# Patient Record
Sex: MALE | Race: WHITE | Employment: FULL TIME | URBAN - METROPOLITAN AREA
[De-identification: names, ages, dates, MRNs, and addresses within clinical notes are randomized per-mention and may not be internally consistent; named-entity substitution may affect disease eponyms.]

---

## 2017-10-09 ENCOUNTER — TELEPHONE (OUTPATIENT)
Dept: ADMINISTRATIVE | Age: 57
End: 2017-10-09

## 2017-10-10 NOTE — TELEPHONE ENCOUNTER
Patient was previous patient and is requesting to re-establish care also his wife jorge is current patient. Please advise if ok to schedule to re-establish and if so when?

## 2017-10-17 ENCOUNTER — TELEPHONE (OUTPATIENT)
Dept: FAMILY MEDICINE CLINIC | Age: 57
End: 2017-10-17

## 2017-10-17 ENCOUNTER — OFFICE VISIT (OUTPATIENT)
Dept: FAMILY MEDICINE CLINIC | Age: 57
End: 2017-10-17

## 2017-10-17 VITALS
DIASTOLIC BLOOD PRESSURE: 84 MMHG | HEART RATE: 74 BPM | RESPIRATION RATE: 16 BRPM | SYSTOLIC BLOOD PRESSURE: 142 MMHG | WEIGHT: 191.6 LBS | HEIGHT: 75 IN | TEMPERATURE: 98.6 F | OXYGEN SATURATION: 98 % | BODY MASS INDEX: 23.82 KG/M2

## 2017-10-17 DIAGNOSIS — I15.9 SECONDARY HYPERTENSION: Primary | ICD-10-CM

## 2017-10-17 PROCEDURE — G8420 CALC BMI NORM PARAMETERS: HCPCS | Performed by: NURSE PRACTITIONER

## 2017-10-17 PROCEDURE — 1036F TOBACCO NON-USER: CPT | Performed by: NURSE PRACTITIONER

## 2017-10-17 PROCEDURE — G8427 DOCREV CUR MEDS BY ELIG CLIN: HCPCS | Performed by: NURSE PRACTITIONER

## 2017-10-17 PROCEDURE — 3017F COLORECTAL CA SCREEN DOC REV: CPT | Performed by: NURSE PRACTITIONER

## 2017-10-17 PROCEDURE — 99203 OFFICE O/P NEW LOW 30 MIN: CPT | Performed by: NURSE PRACTITIONER

## 2017-10-17 PROCEDURE — G8484 FLU IMMUNIZE NO ADMIN: HCPCS | Performed by: NURSE PRACTITIONER

## 2017-10-17 RX ORDER — HYDROCHLOROTHIAZIDE 25 MG/1
12.5 TABLET ORAL DAILY PRN
Qty: 30 TABLET | Refills: 1 | Status: SHIPPED | OUTPATIENT
Start: 2017-10-17 | End: 2017-11-24 | Stop reason: ALTCHOICE

## 2017-10-17 ASSESSMENT — ENCOUNTER SYMPTOMS
CONSTIPATION: 0
SORE THROAT: 0
NAUSEA: 0
SINUS PAIN: 0
PHOTOPHOBIA: 0
SHORTNESS OF BREATH: 0
EYE REDNESS: 0
DIARRHEA: 0
WHEEZING: 0
VOMITING: 0
RHINORRHEA: 0
SINUS PRESSURE: 0
EYE PAIN: 0
EYE DISCHARGE: 0
EYE ITCHING: 0
CHEST TIGHTNESS: 0

## 2017-10-17 ASSESSMENT — PATIENT HEALTH QUESTIONNAIRE - PHQ9
SUM OF ALL RESPONSES TO PHQ9 QUESTIONS 1 & 2: 0
2. FEELING DOWN, DEPRESSED OR HOPELESS: 0
SUM OF ALL RESPONSES TO PHQ QUESTIONS 1-9: 0
1. LITTLE INTEREST OR PLEASURE IN DOING THINGS: 0

## 2017-10-17 NOTE — PROGRESS NOTES
diaphoresis, fatigue and fever. HENT: Negative for congestion, dental problem, ear discharge, ear pain, postnasal drip, rhinorrhea, sinus pain, sinus pressure, sneezing and sore throat. Eyes: Negative for photophobia, pain, discharge, redness, itching and visual disturbance. Respiratory: Negative for chest tightness, shortness of breath and wheezing. Cardiovascular: Negative for chest pain and leg swelling. Gastrointestinal: Negative for constipation, diarrhea, nausea and vomiting. Musculoskeletal: Negative for arthralgias and myalgias. Allergic/Immunologic: Negative for environmental allergies and food allergies. Neurological: Positive for headaches. Negative for dizziness, syncope, weakness and numbness. Objective:   BP (!) 142/84 (Site: Right Arm, Position: Sitting, Cuff Size: Medium Adult)   Pulse 74   Temp 98.6 °F (37 °C) (Temporal)   Resp 16   Ht 6' 3.43\" (1.916 m)   Wt 191 lb 9.6 oz (86.9 kg)   SpO2 98%   BMI 23.67 kg/m²     Physical Exam   Constitutional: He is oriented to person, place, and time. Vital signs are normal. He appears well-developed and well-nourished. HENT:   Head: Normocephalic. Right Ear: Hearing, tympanic membrane, external ear and ear canal normal.   Left Ear: Hearing, tympanic membrane, external ear and ear canal normal.   Nose: Nose normal.   Mouth/Throat: Uvula is midline, oropharynx is clear and moist and mucous membranes are normal.   Eyes: Conjunctivae and EOM are normal.   Neck: Normal range of motion. Neck supple. Cardiovascular: Normal rate and regular rhythm. Pulmonary/Chest: Effort normal and breath sounds normal.   Abdominal: Normal appearance and bowel sounds are normal.   Musculoskeletal: Normal range of motion. Neurological: He is alert and oriented to person, place, and time. Skin: Skin is warm and dry. Psychiatric: He has a normal mood and affect.  His behavior is normal. Judgment and thought content normal.   Nursing note and vitals reviewed. Assessment:     1. Secondary hypertension         Plan:      No orders of the defined types were placed in this encounter. Orders Placed This Encounter   Medications    hydrochlorothiazide (HYDRODIURIL) 25 MG tablet     Sig: Take 0.5 tablets by mouth daily as needed (Systolic Blood Pressure greater than 170)     Dispense:  30 tablet     Refill:  1     Health maintenance reviewed in great detail this visit. Patient has declined all labs and health maintenance recommendations at this time. Discussed in detail how to take 1/2 tab of hydrodiuril as needed for systolic blood pressure over 170. High blood pressure cough be related to work stress and higher intake of salty foods. Advised patient to check blood pressure twice a day and write it down in a journal for Dr. Monserrat Mullen at your next visit. Only take medication if above parameters. Monitor for signs of low blood pressure such as fatigue, dizziness, lightheadedness, clammy skin. If those occur stop taking medication and follow up. Continue to exercise, and monitor your diet. Return for Schedule with cy follow up blood pressure. .    Reviewed with the patient: current clinical status, medications, activities and diet. Side effects, adverse effects of the medication prescribed today, as well as treatment plan/ rationale and result expectations have been discussed with the patient who expresses understanding and desires to proceed. Close follow up to evaluate treatment results and for coordination of care. I have reviewed the patient's medical history in detail and updated the computerized patient record.     Germain Schmidt NP

## 2017-10-17 NOTE — PATIENT INSTRUCTIONS
Patient Education        DASH Diet: Care Instructions  Your Care Instructions  The DASH diet is an eating plan that can help lower your blood pressure. DASH stands for Dietary Approaches to Stop Hypertension. Hypertension is high blood pressure. The DASH diet focuses on eating foods that are high in calcium, potassium, and magnesium. These nutrients can lower blood pressure. The foods that are highest in these nutrients are fruits, vegetables, low-fat dairy products, nuts, seeds, and legumes. But taking calcium, potassium, and magnesium supplements instead of eating foods that are high in those nutrients does not have the same effect. The DASH diet also includes whole grains, fish, and poultry. The DASH diet is one of several lifestyle changes your doctor may recommend to lower your high blood pressure. Your doctor may also want you to decrease the amount of sodium in your diet. Lowering sodium while following the DASH diet can lower blood pressure even further than just the DASH diet alone. Follow-up care is a key part of your treatment and safety. Be sure to make and go to all appointments, and call your doctor if you are having problems. It's also a good idea to know your test results and keep a list of the medicines you take. How can you care for yourself at home? Following the DASH diet  · Eat 4 to 5 servings of fruit each day. A serving is 1 medium-sized piece of fruit, ½ cup chopped or canned fruit, 1/4 cup dried fruit, or 4 ounces (½ cup) of fruit juice. Choose fruit more often than fruit juice. · Eat 4 to 5 servings of vegetables each day. A serving is 1 cup of lettuce or raw leafy vegetables, ½ cup of chopped or cooked vegetables, or 4 ounces (½ cup) of vegetable juice. Choose vegetables more often than vegetable juice. · Get 2 to 3 servings of low-fat and fat-free dairy each day. A serving is 8 ounces of milk, 1 cup of yogurt, or 1 ½ ounces of cheese. · Eat 6 to 8 servings of grains each day.  A serving is 1 slice of bread, 1 ounce of dry cereal, or ½ cup of cooked rice, pasta, or cooked cereal. Try to choose whole-grain products as much as possible. · Limit lean meat, poultry, and fish to 2 servings each day. A serving is 3 ounces, about the size of a deck of cards. · Eat 4 to 5 servings of nuts, seeds, and legumes (cooked dried beans, lentils, and split peas) each week. A serving is 1/3 cup of nuts, 2 tablespoons of seeds, or ½ cup of cooked beans or peas. · Limit fats and oils to 2 to 3 servings each day. A serving is 1 teaspoon of vegetable oil or 2 tablespoons of salad dressing. · Limit sweets and added sugars to 5 servings or less a week. A serving is 1 tablespoon jelly or jam, ½ cup sorbet, or 1 cup of lemonade. · Eat less than 2,300 milligrams (mg) of sodium a day. If you limit your sodium to 1,500 mg a day, you can lower your blood pressure even more. Tips for success  · Start small. Do not try to make dramatic changes to your diet all at once. You might feel that you are missing out on your favorite foods and then be more likely to not follow the plan. Make small changes, and stick with them. Once those changes become habit, add a few more changes. · Try some of the following:  ¨ Make it a goal to eat a fruit or vegetable at every meal and at snacks. This will make it easy to get the recommended amount of fruits and vegetables each day. ¨ Try yogurt topped with fruit and nuts for a snack or healthy dessert. ¨ Add lettuce, tomato, cucumber, and onion to sandwiches. ¨ Combine a ready-made pizza crust with low-fat mozzarella cheese and lots of vegetable toppings. Try using tomatoes, squash, spinach, broccoli, carrots, cauliflower, and onions. ¨ Have a variety of cut-up vegetables with a low-fat dip as an appetizer instead of chips and dip. ¨ Sprinkle sunflower seeds or chopped almonds over salads. Or try adding chopped walnuts or almonds to cooked vegetables.   ¨ Try some vegetarian meals using beans and peas. Add garbanzo or kidney beans to salads. Make burritos and tacos with mashed quiñones beans or black beans. Where can you learn more? Go to https://harish.[employer]. org and sign in to your [employer] account. Enter U128 in the [employer] box to learn more about \"DASH Diet: Care Instructions. \"     If you do not have an account, please click on the \"Sign Up Now\" link. Current as of: April 3, 2017  Content Version: 11.3  © 7220-9757 [employer]. Care instructions adapted under license by [facility] ([facility]). If you have questions about a medical condition or this instruction, always ask your healthcare professional. [address] any warranty or liability for your use of this information. Patient Education        High Blood Pressure: Care Instructions  Your Care Instructions  If your blood pressure is usually above 140/90, you have high blood pressure, or hypertension. That means the top number is 140 or higher or the bottom number is 90 or higher, or both. Despite what a lot of people think, high blood pressure usually doesn't cause headaches or make you feel dizzy or lightheaded. It usually has no symptoms. But it does increase your risk for heart attack, stroke, and kidney or eye damage. The higher your blood pressure, the more your risk increases. Your doctor will give you a goal for your blood pressure. Your goal will be based on your health and your age. An example of a goal is to keep your blood pressure below 140/90. Lifestyle changes, such as eating healthy and being active, are always important to help lower blood pressure. You might also take medicine to reach your blood pressure goal.  Follow-up care is a key part of your treatment and safety. Be sure to make and go to all appointments, and call your doctor if you are having problems. It's also a good idea to know your test results and keep a list of the medicines you take.   How increases your risk for heart attack and stroke. If you need help quitting, talk to your doctor about stop-smoking programs and medicines. These can increase your chances of quitting for good. When should you call for help? Call 911 anytime you think you may need emergency care. This may mean having symptoms that suggest that your blood pressure is causing a serious heart or blood vessel problem. Your blood pressure may be over 180/110. For example, call 911 if:  · You have symptoms of a heart attack. These may include:  ¨ Chest pain or pressure, or a strange feeling in the chest.  ¨ Sweating. ¨ Shortness of breath. ¨ Nausea or vomiting. ¨ Pain, pressure, or a strange feeling in the back, neck, jaw, or upper belly or in one or both shoulders or arms. ¨ Lightheadedness or sudden weakness. ¨ A fast or irregular heartbeat. · You have symptoms of a stroke. These may include:  ¨ Sudden numbness, tingling, weakness, or loss of movement in your face, arm, or leg, especially on only one side of your body. ¨ Sudden vision changes. ¨ Sudden trouble speaking. ¨ Sudden confusion or trouble understanding simple statements. ¨ Sudden problems with walking or balance. ¨ A sudden, severe headache that is different from past headaches. · You have severe back or belly pain. Do not wait until your blood pressure comes down on its own. Get help right away. Call your doctor now or seek immediate care if:  · Your blood pressure is much higher than normal (such as 180/110 or higher), but you don't have symptoms. · You think high blood pressure is causing symptoms, such as:  ¨ Severe headache. ¨ Blurry vision. Watch closely for changes in your health, and be sure to contact your doctor if:  · Your blood pressure measures 140/90 or higher at least 2 times. That means the top number is 140 or higher or the bottom number is 90 or higher, or both.   · You think you may be having side effects from your blood pressure medicine. · Your blood pressure is usually normal, but it goes above normal at least 2 times. Where can you learn more? Go to [email or web address] org and sign in to your [employer] account. Enter M593 in the [employer] box to learn more about \"High Blood Pressure: Care Instructions. \"     If you do not have an account, please click on the \"Sign Up Now\" link. Current as of: August 8, 2016  Content Version: 11.3  © 5731-7546 [employer]. Care instructions adapted under license by [facility] [address] If you have questions about a medical condition or this instruction, always ask your healthcare professional. [address] any warranty or liability for your use of this information.

## 2017-10-18 ENCOUNTER — TELEPHONE (OUTPATIENT)
Dept: FAMILY MEDICINE CLINIC | Age: 57
End: 2017-10-18

## 2017-10-18 NOTE — TELEPHONE ENCOUNTER
SCHEDULED 2 MA APPOINTMENTS FOR B/P CHECK DUE TO [place]ING [place] OF [place], ONE BEFORE AND ONE AFTER. HE WILL BRING HIS CUFF WITH HIM ON FIRST VISIT.  THEN SEEING DOCTOR ON 11-24-17

## 2017-11-17 ENCOUNTER — NURSE ONLY (OUTPATIENT)
Dept: FAMILY MEDICINE CLINIC | Age: 57
End: 2017-11-17

## 2017-11-17 VITALS — DIASTOLIC BLOOD PRESSURE: 86 MMHG | SYSTOLIC BLOOD PRESSURE: 162 MMHG

## 2017-11-17 DIAGNOSIS — I15.9 SECONDARY HYPERTENSION: Primary | ICD-10-CM

## 2017-11-24 ENCOUNTER — OFFICE VISIT (OUTPATIENT)
Dept: FAMILY MEDICINE CLINIC | Age: 57
End: 2017-11-24

## 2017-11-24 VITALS
WEIGHT: 195.2 LBS | RESPIRATION RATE: 20 BRPM | BODY MASS INDEX: 24.27 KG/M2 | DIASTOLIC BLOOD PRESSURE: 86 MMHG | SYSTOLIC BLOOD PRESSURE: 136 MMHG | HEIGHT: 75 IN | HEART RATE: 76 BPM | TEMPERATURE: 98.1 F

## 2017-11-24 DIAGNOSIS — Z12.11 SCREENING FOR COLON CANCER: ICD-10-CM

## 2017-11-24 DIAGNOSIS — Z00.00 HEALTHCARE MAINTENANCE: Primary | ICD-10-CM

## 2017-11-24 DIAGNOSIS — I10 ESSENTIAL HYPERTENSION: ICD-10-CM

## 2017-11-24 PROCEDURE — 99396 PREV VISIT EST AGE 40-64: CPT | Performed by: FAMILY MEDICINE

## 2017-11-24 RX ORDER — LISINOPRIL 5 MG/1
5 TABLET ORAL DAILY
Qty: 30 TABLET | Refills: 1 | Status: SHIPPED | OUTPATIENT
Start: 2017-11-24 | End: 2018-02-16 | Stop reason: DRUGHIGH

## 2017-11-24 NOTE — PATIENT INSTRUCTIONS
arms.  ¨ Lightheadedness or sudden weakness. ¨ A fast or irregular heartbeat. · You have symptoms of a stroke. These may include:  ¨ Sudden numbness, tingling, weakness, or loss of movement in your face, arm, or leg, especially on only one side of your body. ¨ Sudden vision changes. ¨ Sudden trouble speaking. ¨ Sudden confusion or trouble understanding simple statements. ¨ Sudden problems with walking or balance. ¨ A sudden, severe headache that is different from past headaches. · You have severe back or belly pain. Do not wait until your blood pressure comes down on its own. Get help right away. Call your doctor now or seek immediate care if:  · Your blood pressure is much higher than normal (such as 180/110 or higher), but you don't have symptoms. · You think high blood pressure is causing symptoms, such as:  ¨ Severe headache. ¨ Blurry vision. Watch closely for changes in your health, and be sure to contact your doctor if:  · Your blood pressure measures 140/90 or higher at least 2 times. That means the top number is 140 or higher or the bottom number is 90 or higher, or both. · You think you may be having side effects from your blood pressure medicine. · Your blood pressure is usually normal, but it goes above normal at least 2 times. Where can you learn more? Go to https://[employer]. org and sign in to your [employer] account. Enter X154 in the [facility] box to learn more about \"High Blood Pressure: Care Instructions. \"     If you do not have an account, please click on the \"Sign Up Now\" link. Current as of: August 8, 2016  Content Version: 11.3  © 6890-0607 [employer]. Care instructions adapted under license by [facility] ([facility]). If you have questions about a medical condition or this instruction, always ask your healthcare professional. [place] [address] any warranty or liability for your use of this information.

## 2017-11-24 NOTE — PROGRESS NOTES
Chief Complaint   Patient presents with    Follow-up     HTN       Abdon Ferris is 61 y.o. male who presents to [facility] for annual physical exam and for follow-up on hypertension. He was recently seen by Eric and was started on hydrochlorothiazide. He has been taking the medication  regularly with no side effects. The patient is taking hypertensive medications compliantly without side effects. Denies chest pain, dyspnea, edema, or TIA's. Patient Active Problem List   Diagnosis    Essential hypertension       has a current medication list which includes the following prescription(s): lisinopril and aspirin. History reviewed. No pertinent past medical history. History reviewed. No pertinent surgical history. family history includes Heart Attack in his father; High Blood Pressure in his father; No Known Problems in his mother. Social History     Social History    Marital status:      Spouse name: N/A    Number of children: N/A    Years of education: N/A     Occupational History    Not on file.      Social History Main Topics    Smoking status: Never Smoker    Smokeless tobacco: Never Used    Alcohol use Yes    Drug use: Unknown    Sexual activity: Not on file     Other Topics Concern    Not on file     Social History Narrative    No narrative on file       No Known Allergies    Review of Systems - General ROS: negative for - fatigue, fever, malaise, night sweats, sleep disturbance or weight loss  Psychological ROS: negative for - anxiety, concentration difficulties, depression, memory difficulties or sleep disturbances  ENT ROS: negative for - hearing change, nasal discharge, oral lesions, sinus pain, sore throat, tinnitus or vertigo  Allergy and Immunology ROS: negative for - hives, nasal congestion or seasonal allergies  Hematological and Lymphatic ROS: negative for - bruising, fatigue, night sweats or pallor  Endocrine ROS: negative for - hot flashes, Differential   2. Essential hypertension  Comprehensive Metabolic Panel    Lipid Panel   3. Screening for colon cancer  POCT Fecal Immunochemical Test (FIT)         Orders Placed This Encounter   Procedures    Comprehensive Metabolic Panel     Standing Status:   Future     Standing Expiration Date:   11/24/2018    Lipid Panel     Standing Status:   Future     Standing Expiration Date:   11/24/2018     Order Specific Question:   Is Patient Fasting?/# of Hours     Answer:   8-10    CBC With Auto Differential     Standing Status:   Future     Standing Expiration Date:   11/24/2018    POCT Fecal Immunochemical Test (FIT)     Standing Status:   Future     Standing Expiration Date:   11/24/2018       Outpatient Encounter Prescriptions as of 11/24/2017   Medication Sig Dispense Refill    lisinopril (PRINIVIL;ZESTRIL) 5 MG tablet Take 1 tablet by mouth daily 30 tablet 1    aspirin 81 MG tablet Take 81 mg by mouth daily      [DISCONTINUED] hydrochlorothiazide (HYDRODIURIL) 25 MG tablet Take 0.5 tablets by mouth daily as needed (Systolic Blood Pressure greater than 170) 30 tablet 1     No facility-administered encounter medications on file as of 11/24/2017. The nature of cardiac risk has been fully discussed with this patient. I have made him aware of his LDL target goal given his cardiovascular risk analysis. I have discussed the appropriate diet. The need for lifelong compliance in order to reduce risk is stressed. A regular exercise program is recommended to help achieve and maintain normal body weight, fitness and improve lipid balance. A written copy of a low fat, low cholesterol diet has been given to the patient. Patient education provided. They understand and agree with this course of treatment. They will return with new or worsening symptoms. Patient instructed to remain current with appropriate annual health maintenance. Return in about 6 weeks (around 1/5/2018).

## 2017-12-27 DIAGNOSIS — I10 ESSENTIAL HYPERTENSION: ICD-10-CM

## 2017-12-27 DIAGNOSIS — Z00.00 HEALTHCARE MAINTENANCE: ICD-10-CM

## 2017-12-27 LAB
ALBUMIN SERPL-MCNC: 4.6 G/DL (ref 3.9–4.9)
ALP BLD-CCNC: 59 U/L (ref 35–104)
ALT SERPL-CCNC: 19 U/L (ref 0–41)
ANION GAP SERPL CALCULATED.3IONS-SCNC: 15 MEQ/L (ref 7–13)
AST SERPL-CCNC: 19 U/L (ref 0–40)
BASOPHILS ABSOLUTE: 0.1 K/UL (ref 0–0.2)
BASOPHILS RELATIVE PERCENT: 1.3 %
BILIRUB SERPL-MCNC: 0.7 MG/DL (ref 0–1.2)
BUN BLDV-MCNC: 27 MG/DL (ref 6–20)
CALCIUM SERPL-MCNC: 9.3 MG/DL (ref 8.6–10.2)
CHLORIDE BLD-SCNC: 105 MEQ/L (ref 98–107)
CHOLESTEROL, TOTAL: 202 MG/DL (ref 0–199)
CO2: 23 MEQ/L (ref 22–29)
CREAT SERPL-MCNC: 0.87 MG/DL (ref 0.7–1.2)
EOSINOPHILS ABSOLUTE: 0.3 K/UL (ref 0–0.7)
EOSINOPHILS RELATIVE PERCENT: 6.6 %
GFR AFRICAN AMERICAN: >60
GFR NON-AFRICAN AMERICAN: >60
GLOBULIN: 2.6 G/DL (ref 2.3–3.5)
GLUCOSE BLD-MCNC: 108 MG/DL (ref 74–109)
HCT VFR BLD CALC: 44.7 % (ref 42–52)
HDLC SERPL-MCNC: 52 MG/DL (ref 40–59)
HEMOGLOBIN: 15.1 G/DL (ref 14–18)
LDL CHOLESTEROL CALCULATED: 135 MG/DL (ref 0–129)
LYMPHOCYTES ABSOLUTE: 1.3 K/UL (ref 1–4.8)
LYMPHOCYTES RELATIVE PERCENT: 32.5 %
MCH RBC QN AUTO: 30.8 PG (ref 27–31.3)
MCHC RBC AUTO-ENTMCNC: 33.7 % (ref 33–37)
MCV RBC AUTO: 91.3 FL (ref 80–100)
MONOCYTES ABSOLUTE: 0.4 K/UL (ref 0.2–0.8)
MONOCYTES RELATIVE PERCENT: 10.6 %
NEUTROPHILS ABSOLUTE: 1.9 K/UL (ref 1.4–6.5)
NEUTROPHILS RELATIVE PERCENT: 49 %
PDW BLD-RTO: 13.5 % (ref 11.5–14.5)
PLATELET # BLD: 182 K/UL (ref 130–400)
POTASSIUM SERPL-SCNC: 4.7 MEQ/L (ref 3.5–5.1)
RBC # BLD: 4.9 M/UL (ref 4.7–6.1)
SLIDE REVIEW: ABNORMAL
SODIUM BLD-SCNC: 143 MEQ/L (ref 132–144)
TOTAL PROTEIN: 7.2 G/DL (ref 6.4–8.1)
TRIGL SERPL-MCNC: 75 MG/DL (ref 0–200)
WBC # BLD: 3.9 K/UL (ref 4.8–10.8)

## 2018-01-05 ENCOUNTER — OFFICE VISIT (OUTPATIENT)
Dept: FAMILY MEDICINE CLINIC | Age: 58
End: 2018-01-05

## 2018-01-05 VITALS
SYSTOLIC BLOOD PRESSURE: 138 MMHG | TEMPERATURE: 97.4 F | DIASTOLIC BLOOD PRESSURE: 88 MMHG | WEIGHT: 202 LBS | RESPIRATION RATE: 18 BRPM | BODY MASS INDEX: 24.6 KG/M2 | HEART RATE: 72 BPM | HEIGHT: 76 IN

## 2018-01-05 DIAGNOSIS — I10 ESSENTIAL HYPERTENSION: Primary | ICD-10-CM

## 2018-01-05 PROCEDURE — 99213 OFFICE O/P EST LOW 20 MIN: CPT | Performed by: FAMILY MEDICINE

## 2018-01-05 PROCEDURE — G8420 CALC BMI NORM PARAMETERS: HCPCS | Performed by: FAMILY MEDICINE

## 2018-01-05 PROCEDURE — 3017F COLORECTAL CA SCREEN DOC REV: CPT | Performed by: FAMILY MEDICINE

## 2018-01-05 PROCEDURE — G8484 FLU IMMUNIZE NO ADMIN: HCPCS | Performed by: FAMILY MEDICINE

## 2018-01-05 PROCEDURE — 1036F TOBACCO NON-USER: CPT | Performed by: FAMILY MEDICINE

## 2018-01-05 PROCEDURE — G8427 DOCREV CUR MEDS BY ELIG CLIN: HCPCS | Performed by: FAMILY MEDICINE

## 2018-01-05 RX ORDER — LISINOPRIL 5 MG/1
5 TABLET ORAL DAILY
Qty: 30 TABLET | Status: CANCELLED | OUTPATIENT
Start: 2018-01-05

## 2018-01-05 RX ORDER — LISINOPRIL AND HYDROCHLOROTHIAZIDE 12.5; 1 MG/1; MG/1
1 TABLET ORAL DAILY
Qty: 30 TABLET | Refills: 1 | Status: SHIPPED | OUTPATIENT
Start: 2018-01-05 | End: 2018-02-16 | Stop reason: SDUPTHER

## 2018-01-05 NOTE — PATIENT INSTRUCTIONS
Patient Education        High Blood Pressure: Care Instructions  Your Care Instructions    If your blood pressure is usually above 140/90, you have high blood pressure, or hypertension. That means the top number is 140 or higher or the bottom number is 90 or higher, or both. Despite what a lot of people think, high blood pressure usually doesn't cause headaches or make you feel dizzy or lightheaded. It usually has no symptoms. But it does increase your risk for heart attack, stroke, and kidney or eye damage. The higher your blood pressure, the more your risk increases. Your doctor will give you a goal for your blood pressure. Your goal will be based on your health and your age. An example of a goal is to keep your blood pressure below 140/90. Lifestyle changes, such as eating healthy and being active, are always important to help lower blood pressure. You might also take medicine to reach your blood pressure goal.  Follow-up care is a key part of your treatment and safety. Be sure to make and go to all appointments, and call your doctor if you are having problems. It's also a good idea to know your test results and keep a list of the medicines you take. How can you care for yourself at home? Medical treatment  · If you stop taking your medicine, your blood pressure will go back up. You may take one or more types of medicine to lower your blood pressure. Be safe with medicines. Take your medicine exactly as prescribed. Call your doctor if you think you are having a problem with your medicine. · Talk to your doctor before you start taking aspirin every day. Aspirin can help certain people lower their risk of a heart attack or stroke. But taking aspirin isn't right for everyone, because it can cause serious bleeding. · See your doctor regularly. You may need to see the doctor more often at first or until your blood pressure comes down.   · If you are taking blood pressure medicine, talk to your doctor before

## 2018-02-16 ENCOUNTER — OFFICE VISIT (OUTPATIENT)
Dept: FAMILY MEDICINE CLINIC | Age: 58
End: 2018-02-16
Payer: COMMERCIAL

## 2018-02-16 VITALS
DIASTOLIC BLOOD PRESSURE: 78 MMHG | WEIGHT: 195.4 LBS | HEIGHT: 76 IN | BODY MASS INDEX: 23.79 KG/M2 | SYSTOLIC BLOOD PRESSURE: 124 MMHG | HEART RATE: 72 BPM | RESPIRATION RATE: 16 BRPM | TEMPERATURE: 98.2 F

## 2018-02-16 DIAGNOSIS — Z12.11 COLON CANCER SCREENING: ICD-10-CM

## 2018-02-16 DIAGNOSIS — I10 ESSENTIAL HYPERTENSION: Primary | ICD-10-CM

## 2018-02-16 PROCEDURE — G8484 FLU IMMUNIZE NO ADMIN: HCPCS | Performed by: FAMILY MEDICINE

## 2018-02-16 PROCEDURE — 3017F COLORECTAL CA SCREEN DOC REV: CPT | Performed by: FAMILY MEDICINE

## 2018-02-16 PROCEDURE — 1036F TOBACCO NON-USER: CPT | Performed by: FAMILY MEDICINE

## 2018-02-16 PROCEDURE — 99213 OFFICE O/P EST LOW 20 MIN: CPT | Performed by: FAMILY MEDICINE

## 2018-02-16 PROCEDURE — G8427 DOCREV CUR MEDS BY ELIG CLIN: HCPCS | Performed by: FAMILY MEDICINE

## 2018-02-16 PROCEDURE — G8420 CALC BMI NORM PARAMETERS: HCPCS | Performed by: FAMILY MEDICINE

## 2018-02-16 RX ORDER — ASPIRIN 325 MG
325 TABLET ORAL DAILY
COMMUNITY

## 2018-02-16 RX ORDER — LISINOPRIL AND HYDROCHLOROTHIAZIDE 12.5; 1 MG/1; MG/1
1 TABLET ORAL DAILY
Qty: 90 TABLET | Refills: 1 | Status: SHIPPED | OUTPATIENT
Start: 2018-02-16 | End: 2018-12-04 | Stop reason: SDUPTHER

## 2018-02-16 NOTE — PATIENT INSTRUCTIONS
arms.  ¨ Lightheadedness or sudden weakness. ¨ A fast or irregular heartbeat. ? · You have symptoms of a stroke. These may include:  ¨ Sudden numbness, tingling, weakness, or loss of movement in your face, arm, or leg, especially on only one side of your body. ¨ Sudden vision changes. ¨ Sudden trouble speaking. ¨ Sudden confusion or trouble understanding simple statements. ¨ Sudden problems with walking or balance. ¨ A sudden, severe headache that is different from past headaches. ? · You have severe back or belly pain. ?Do not wait until your blood pressure comes down on its own. Get help right away. ?Call your doctor now or seek immediate care if:  ? · Your blood pressure is much higher than normal (such as 180/110 or higher), but you don't have symptoms. ? · You think high blood pressure is causing symptoms, such as:  ¨ Severe headache. ¨ Blurry vision. ? Watch closely for changes in your health, and be sure to contact your doctor if:  ? · Your blood pressure measures 140/90 or higher at least 2 times. That means the top number is 140 or higher or the bottom number is 90 or higher, or both. ? · You think you may be having side effects from your blood pressure medicine. ? · Your blood pressure is usually normal, but it goes above normal at least 2 times. Where can you learn more? Go to https://[facility]. George[facility] UniversitypeflaquitaMdundo.[employer]. org and sign in to your [employer] account. Enter A140 in the [employer] box to learn more about \"High Blood Pressure: Care Instructions. \"     If you do not have an account, please click on the \"Sign Up Now\" link. Current as of: Gilma 10, 2017  Content Version: 11.5  © 0595-0400 Healthwise, [employer]. Care instructions adapted under license by [facility] ([facility]).  If you have questions about a medical condition or this instruction, always ask your healthcare professional. Claudy Beltran any warranty or liability for your use of this

## 2018-02-16 NOTE — PROGRESS NOTES
Kristen Ding is a 61 y.o. male who is here for follow-up of elevated blood pressure and other medical conditions. He is exercising and is adherent to a low-salt diet. Blood pressure is well controlled at home. Cardiac symptoms: none. Patient denies chest pain, claudication, dyspnea, lower extremity edema, near-syncope, orthopnea, palpitations, paroxysmal nocturnal dyspnea and syncope. Cardiovascular risk factors: advanced age (older than 54 for men, 72 for women), hypertension and male gender. Use of agents associated with hypertension: none. History of target organ damage: none. Patient's medications, allergies, past medical, surgical, social and family histories were reviewed and updated as appropriate. Review of Systems  Constitutional: negative  Ears, nose, mouth, throat, and face: negative for ear drainage, earaches, hoarseness, nasal congestion and sore throat  Respiratory: negative for cough, dyspnea on exertion, pleurisy/chest pain, shortness of breath and wheezing  Cardiovascular: negative for chest pain, chest pressure/discomfort, claudication, dyspnea, exertional chest pressure/discomfort, fatigue, irregular heart beat and lower extremity edema  Gastrointestinal: negative for abdominal pain, change in bowel habits, constipation, diarrhea, nausea and vomiting  Integument/breast: negative  Hematologic/lymphatic: negative for bleeding, easy bruising and lymphadenopathy  Musculoskeletal:negative  Neurological: negative for dizziness and headaches     Blood pressure 124/78, pulse 72, temperature 98.2 °F (36.8 °C), temperature source Temporal, resp. rate 16, height 6' 3.5\" (1.918 m), weight 195 lb 6.4 oz (88.6 kg).     Physical Examination: General appearance - alert, well appearing, and in no distress  Mental status - alert, oriented to person, place, and time  Eyes - pupils equal and reactive, extraocular eye movements intact  Ears - bilateral TM's and external ear canals normal  Mouth - mucous

## 2018-12-06 RX ORDER — LISINOPRIL AND HYDROCHLOROTHIAZIDE 12.5; 1 MG/1; MG/1
1 TABLET ORAL DAILY
Qty: 30 TABLET | OUTPATIENT
Start: 2018-12-06

## 2018-12-29 RX ORDER — LISINOPRIL AND HYDROCHLOROTHIAZIDE 12.5; 1 MG/1; MG/1
1 TABLET ORAL DAILY
Qty: 30 TABLET | Refills: 0 | Status: SHIPPED | OUTPATIENT
Start: 2018-12-29 | End: 2019-01-07 | Stop reason: SDUPTHER

## 2019-01-07 ENCOUNTER — OFFICE VISIT (OUTPATIENT)
Dept: FAMILY MEDICINE CLINIC | Age: 59
End: 2019-01-07
Payer: COMMERCIAL

## 2019-01-07 VITALS
DIASTOLIC BLOOD PRESSURE: 84 MMHG | RESPIRATION RATE: 12 BRPM | SYSTOLIC BLOOD PRESSURE: 138 MMHG | BODY MASS INDEX: 23.99 KG/M2 | HEIGHT: 76 IN | HEART RATE: 68 BPM | TEMPERATURE: 97.6 F | WEIGHT: 197 LBS

## 2019-01-07 DIAGNOSIS — Z00.00 HEALTHCARE MAINTENANCE: Primary | ICD-10-CM

## 2019-01-07 DIAGNOSIS — I10 ESSENTIAL HYPERTENSION: ICD-10-CM

## 2019-01-07 PROCEDURE — 99396 PREV VISIT EST AGE 40-64: CPT | Performed by: FAMILY MEDICINE

## 2019-01-07 PROCEDURE — G8484 FLU IMMUNIZE NO ADMIN: HCPCS | Performed by: FAMILY MEDICINE

## 2019-01-07 RX ORDER — LISINOPRIL AND HYDROCHLOROTHIAZIDE 12.5; 1 MG/1; MG/1
1 TABLET ORAL DAILY
Qty: 90 TABLET | Refills: 1 | Status: SHIPPED | OUTPATIENT
Start: 2019-01-07

## 2019-01-07 ASSESSMENT — PATIENT HEALTH QUESTIONNAIRE - PHQ9
2. FEELING DOWN, DEPRESSED OR HOPELESS: 0
1. LITTLE INTEREST OR PLEASURE IN DOING THINGS: 0
SUM OF ALL RESPONSES TO PHQ QUESTIONS 1-9: 0
SUM OF ALL RESPONSES TO PHQ QUESTIONS 1-9: 0
SUM OF ALL RESPONSES TO PHQ9 QUESTIONS 1 & 2: 0

## 2019-01-19 DIAGNOSIS — Z00.00 HEALTHCARE MAINTENANCE: ICD-10-CM

## 2019-01-19 LAB
ALBUMIN SERPL-MCNC: 4.8 G/DL (ref 3.9–4.9)
ALP BLD-CCNC: 59 U/L (ref 35–104)
ALT SERPL-CCNC: 17 U/L (ref 0–41)
ANION GAP SERPL CALCULATED.3IONS-SCNC: 12 MEQ/L (ref 7–13)
AST SERPL-CCNC: 18 U/L (ref 0–40)
ATYPICAL LYMPHOCYTE RELATIVE PERCENT: 5 %
BASOPHILS ABSOLUTE: 0.2 K/UL (ref 0–0.2)
BASOPHILS RELATIVE PERCENT: 5 %
BILIRUB SERPL-MCNC: 0.8 MG/DL (ref 0–1.2)
BUN BLDV-MCNC: 21 MG/DL (ref 6–20)
CALCIUM SERPL-MCNC: 9.8 MG/DL (ref 8.6–10.2)
CHLORIDE BLD-SCNC: 105 MEQ/L (ref 98–107)
CHOLESTEROL, TOTAL: 185 MG/DL (ref 0–199)
CO2: 26 MEQ/L (ref 22–29)
CREAT SERPL-MCNC: 1.01 MG/DL (ref 0.7–1.2)
EOSINOPHILS ABSOLUTE: 0.3 K/UL (ref 0–0.7)
EOSINOPHILS RELATIVE PERCENT: 8 %
GFR AFRICAN AMERICAN: >60
GFR NON-AFRICAN AMERICAN: >60
GLOBULIN: 2.7 G/DL (ref 2.3–3.5)
GLUCOSE BLD-MCNC: 102 MG/DL (ref 74–109)
HCT VFR BLD CALC: 44.9 % (ref 42–52)
HDLC SERPL-MCNC: 46 MG/DL (ref 40–59)
HEMOGLOBIN: 15.4 G/DL (ref 14–18)
LDL CHOLESTEROL CALCULATED: 117 MG/DL (ref 0–129)
LYMPHOCYTES ABSOLUTE: 1.1 K/UL (ref 1–4.8)
LYMPHOCYTES RELATIVE PERCENT: 25 %
MCH RBC QN AUTO: 30.9 PG (ref 27–31.3)
MCHC RBC AUTO-ENTMCNC: 34.3 % (ref 33–37)
MCV RBC AUTO: 90 FL (ref 80–100)
MONOCYTES ABSOLUTE: 0.4 K/UL (ref 0.2–0.8)
MONOCYTES RELATIVE PERCENT: 11.4 %
NEUTROPHILS ABSOLUTE: 1.8 K/UL (ref 1.4–6.5)
NEUTROPHILS RELATIVE PERCENT: 47 %
PDW BLD-RTO: 13.4 % (ref 11.5–14.5)
PLATELET # BLD: 186 K/UL (ref 130–400)
PLATELET SLIDE REVIEW: NORMAL
POTASSIUM SERPL-SCNC: 4.7 MEQ/L (ref 3.5–5.1)
RBC # BLD: 4.99 M/UL (ref 4.7–6.1)
SODIUM BLD-SCNC: 143 MEQ/L (ref 132–144)
TOTAL PROTEIN: 7.5 G/DL (ref 6.4–8.1)
TRIGL SERPL-MCNC: 110 MG/DL (ref 0–200)
WBC # BLD: 3.8 K/UL (ref 4.8–10.8)

## 2019-03-18 ENCOUNTER — TELEPHONE (OUTPATIENT)
Dept: FAMILY MEDICINE CLINIC | Age: 59
End: 2019-03-18

## 2023-10-03 ENCOUNTER — LAB (OUTPATIENT)
Dept: LAB | Facility: LAB | Age: 63
End: 2023-10-03
Payer: COMMERCIAL

## 2023-10-03 DIAGNOSIS — I10 ESSENTIAL (PRIMARY) HYPERTENSION: Primary | ICD-10-CM

## 2023-10-03 LAB
ALBUMIN SERPL BCP-MCNC: 4.5 G/DL (ref 3.4–5)
ALP SERPL-CCNC: 54 U/L (ref 33–136)
ALT SERPL W P-5'-P-CCNC: 15 U/L (ref 10–52)
ANION GAP SERPL CALC-SCNC: 12 MMOL/L (ref 10–20)
AST SERPL W P-5'-P-CCNC: 16 U/L (ref 9–39)
BASOPHILS # BLD AUTO: 0.06 X10*3/UL (ref 0–0.1)
BASOPHILS NFR BLD AUTO: 1.7 %
BILIRUB SERPL-MCNC: 0.9 MG/DL (ref 0–1.2)
BUN SERPL-MCNC: 22 MG/DL (ref 6–23)
CALCIUM SERPL-MCNC: 9.6 MG/DL (ref 8.6–10.3)
CHLORIDE SERPL-SCNC: 104 MMOL/L (ref 98–107)
CHOLEST SERPL-MCNC: 200 MG/DL (ref 0–199)
CHOLESTEROL/HDL RATIO: 4.1
CO2 SERPL-SCNC: 28 MMOL/L (ref 21–32)
CREAT SERPL-MCNC: 1.16 MG/DL (ref 0.5–1.3)
EOSINOPHIL # BLD AUTO: 0.24 X10*3/UL (ref 0–0.7)
EOSINOPHIL NFR BLD AUTO: 6.7 %
ERYTHROCYTE [DISTWIDTH] IN BLOOD BY AUTOMATED COUNT: 13.1 % (ref 11.5–14.5)
GFR SERPL CREATININE-BSD FRML MDRD: 71 ML/MIN/1.73M*2
GLUCOSE SERPL-MCNC: 99 MG/DL (ref 74–99)
HCT VFR BLD AUTO: 45.3 % (ref 41–52)
HDLC SERPL-MCNC: 49 MG/DL
HGB BLD-MCNC: 14.7 G/DL (ref 13.5–17.5)
IMM GRANULOCYTES # BLD AUTO: 0 X10*3/UL (ref 0–0.7)
IMM GRANULOCYTES NFR BLD AUTO: 0 % (ref 0–0.9)
LDLC SERPL CALC-MCNC: 126 MG/DL (ref 140–190)
LYMPHOCYTES # BLD AUTO: 1.23 X10*3/UL (ref 1.2–4.8)
LYMPHOCYTES NFR BLD AUTO: 34.3 %
MCH RBC QN AUTO: 30.2 PG (ref 26–34)
MCHC RBC AUTO-ENTMCNC: 32.5 G/DL (ref 32–36)
MCV RBC AUTO: 93 FL (ref 80–100)
MONOCYTES # BLD AUTO: 0.38 X10*3/UL (ref 0.1–1)
MONOCYTES NFR BLD AUTO: 10.6 %
NEUTROPHILS # BLD AUTO: 1.68 X10*3/UL (ref 1.2–7.7)
NEUTROPHILS NFR BLD AUTO: 46.7 %
NON HDL CHOLESTEROL: 151 MG/DL (ref 0–149)
NRBC BLD-RTO: 0 /100 WBCS (ref 0–0)
PLATELET # BLD AUTO: 214 X10*3/UL (ref 150–450)
PMV BLD AUTO: 11.6 FL (ref 7.5–11.5)
POTASSIUM SERPL-SCNC: 4.4 MMOL/L (ref 3.5–5.3)
PROT SERPL-MCNC: 7.4 G/DL (ref 6.4–8.2)
RBC # BLD AUTO: 4.87 X10*6/UL (ref 4.5–5.9)
SODIUM SERPL-SCNC: 140 MMOL/L (ref 136–145)
TRIGL SERPL-MCNC: 124 MG/DL (ref 0–149)
VLDL: 25 MG/DL (ref 0–40)
WBC # BLD AUTO: 3.6 X10*3/UL (ref 4.4–11.3)

## 2023-10-03 PROCEDURE — 36415 COLL VENOUS BLD VENIPUNCTURE: CPT

## 2023-10-05 PROBLEM — I10 ESSENTIAL HYPERTENSION: Status: ACTIVE | Noted: 2017-11-24

## 2023-10-05 PROBLEM — R73.9 HYPERGLYCEMIA: Status: ACTIVE | Noted: 2023-10-05

## 2023-10-05 PROBLEM — K40.90 INGUINAL HERNIA, LEFT: Status: ACTIVE | Noted: 2023-10-05

## 2023-10-05 PROBLEM — J02.9 ACUTE PHARYNGITIS: Status: ACTIVE | Noted: 2023-10-05

## 2023-10-05 PROBLEM — R10.84 GENERALIZED ABDOMINAL PAIN: Status: ACTIVE | Noted: 2023-10-05

## 2023-10-05 PROBLEM — J20.9 ACUTE BRONCHITIS: Status: ACTIVE | Noted: 2023-10-05

## 2023-10-05 RX ORDER — LISINOPRIL 10 MG/1
1 TABLET ORAL DAILY
COMMUNITY
End: 2023-10-10 | Stop reason: ALTCHOICE

## 2023-10-05 RX ORDER — LISINOPRIL AND HYDROCHLOROTHIAZIDE 10; 12.5 MG/1; MG/1
1 TABLET ORAL
COMMUNITY
Start: 2019-01-07 | End: 2023-10-10 | Stop reason: SDUPTHER

## 2023-10-05 RX ORDER — ASPIRIN 325 MG
325 TABLET ORAL DAILY
COMMUNITY
End: 2023-10-10 | Stop reason: ALTCHOICE

## 2023-10-05 RX ORDER — HYDROCHLOROTHIAZIDE 12.5 MG/1
1 TABLET ORAL DAILY
COMMUNITY
End: 2023-10-10 | Stop reason: ALTCHOICE

## 2023-10-10 ENCOUNTER — OFFICE VISIT (OUTPATIENT)
Dept: PRIMARY CARE | Facility: CLINIC | Age: 63
End: 2023-10-10
Payer: COMMERCIAL

## 2023-10-10 VITALS
RESPIRATION RATE: 16 BRPM | TEMPERATURE: 97.4 F | SYSTOLIC BLOOD PRESSURE: 122 MMHG | HEIGHT: 76 IN | BODY MASS INDEX: 23.79 KG/M2 | DIASTOLIC BLOOD PRESSURE: 78 MMHG | HEART RATE: 58 BPM | WEIGHT: 195.4 LBS | OXYGEN SATURATION: 99 %

## 2023-10-10 DIAGNOSIS — I10 ESSENTIAL HYPERTENSION: Primary | ICD-10-CM

## 2023-10-10 PROCEDURE — 1036F TOBACCO NON-USER: CPT | Performed by: FAMILY MEDICINE

## 2023-10-10 PROCEDURE — 3074F SYST BP LT 130 MM HG: CPT | Performed by: FAMILY MEDICINE

## 2023-10-10 PROCEDURE — 3078F DIAST BP <80 MM HG: CPT | Performed by: FAMILY MEDICINE

## 2023-10-10 PROCEDURE — 99213 OFFICE O/P EST LOW 20 MIN: CPT | Performed by: FAMILY MEDICINE

## 2023-10-10 RX ORDER — LISINOPRIL AND HYDROCHLOROTHIAZIDE 10; 12.5 MG/1; MG/1
1 TABLET ORAL DAILY
Qty: 90 TABLET | Refills: 1 | Status: SHIPPED | OUTPATIENT
Start: 2023-10-10 | End: 2024-04-03

## 2023-10-10 RX ORDER — ASPIRIN 81 MG/1
81 TABLET ORAL DAILY
COMMUNITY

## 2023-10-10 ASSESSMENT — PATIENT HEALTH QUESTIONNAIRE - PHQ9
SUM OF ALL RESPONSES TO PHQ9 QUESTIONS 1 AND 2: 0
1. LITTLE INTEREST OR PLEASURE IN DOING THINGS: NOT AT ALL
2. FEELING DOWN, DEPRESSED OR HOPELESS: NOT AT ALL

## 2023-10-10 NOTE — ASSESSMENT & PLAN NOTE
"Well-controlled, continue current medications and management.  Dietary sodium restriction.  Regular aerobic exercise program is recommended to help achieve and maintain normal body weight, fitness and improve lipid balance. .  Dietary changes: Increase soluble fiber  Plant sterols 2grams per day (e.g. Benecol)  Reduce saturated fat, \"trans\" monounsaturated fatty acids, and cholesterol  "

## 2023-10-10 NOTE — PROGRESS NOTES
Chief Complaint   Patient presents with    Follow-up     Hypertension and labs      Adithya Elias is a 62 y.o. male who presents today for follow up on hypertension. He is exercising and is adherent to a low-salt diet. Blood pressure is well controlled at home. Cardiac symptoms: none. Patient denies chest pain, dyspnea, exertional chest pressure/discomfort, near-syncope, orthopnea, palpitations, paroxysmal nocturnal dyspnea, and syncope. Use of agents associated with hypertension: none. History of target organ damage: none.    Patient Active Problem List   Diagnosis    Acute bronchitis    Acute pharyngitis    Essential hypertension    Generalized abdominal pain    Hyperglycemia    Inguinal hernia, left           Past Medical History:   Diagnosis Date    Other specified health status     No pertinent past medical history        Current Outpatient Medications   Medication Sig Dispense Refill    aspirin 81 mg EC tablet Take 1 tablet (81 mg) by mouth once daily.      lisinopriL-hydrochlorothiazide 10-12.5 mg tablet Take 1 tablet by mouth once daily. 90 tablet 1     No current facility-administered medications for this visit.       No Known Allergies    Social History     Tobacco Use    Smoking status: Never    Smokeless tobacco: Never   Substance Use Topics    Alcohol use: Yes        Review of Systems -   General ROS: negative for - fatigue, fever, malaise, night sweats or weight loss  Eyes ROS no blurred vision, no double vision, no eye discharge and no eye redness.  ENT ROS: negative for - hearing change, nasal discharge, oral lesions, sinus pain, sore throat, tinnitus or vertigo  Respiratory ROS: negative for - cough, hemoptysis, pleuritic pain, shortness of breath or wheezing  Cardiovascular ROS: no chest pain or dyspnea on exertion, palpitations, PND or orthopnea.  No syncope.  Gastrointestinal ROS: no abdominal pain, change in bowel habits, or black or bloody stools  Genito-Urinary ROS: no dysuria, trouble  "voiding, or hematuria  Dermatological ROS: negative for - dry skin, lumps, pruritus or rash  Musculoskeletal ROS: negative for - joint pain, joint stiffness, joint swelling, muscle pain or muscular weakness  Neurological ROS: negative for - dizziness, gait disturbance, headaches, impaired coordination/balance, numbness/tingling, tremors or visual changes  Psychological ROS: negative for - anxiety, concentration difficulties, depression, memory difficulties or sleep disturbances  Endocrine ROS: negative for - hot flashes, malaise/lethargy, palpitations, polydipsia/polyuria, skin changes, temperature intolerance   Hematological and Lymphatic ROS: negative for - bruising, night sweats or pallor    Blood pressure 122/78, pulse 58, temperature 36.3 °C (97.4 °F), resp. rate 16, height 1.93 m (6' 4\"), weight 88.6 kg (195 lb 6.4 oz), SpO2 99 %.    Physical Examination:   General appearance - alert, well appearing, and in no distress  HEENT EOMI, PEERLA, normal eyelids.  Oropharynx no erythema or exudate.  Normal tonsils.    Ears -external auditory canal normal bilaterally.  Tympanic membranes normal bilaterally.  Mouth - mucous membranes moist, pharynx normal without lesions  Neck - supple, trachea central no thyromegaly.  No significant adenopathy  Chest -good air entry bilaterally, no rhonchi rales and no wheezes.  Heart -Normal S1 and S2, regular rate and rhythm with no murmurs gallop or rub.  Abdomen -Non distended, soft, nontender with no guarding, no palpable mass and no CVA tenderness.  Bowel sounds normal.  No hernia.  Skin no rash, nonicteric and warm to touch.  Neurological - alert, oriented, cranial nerves II through XII normal.  Reflexes 2+ bilaterally.  No focal deficit.  Musculoskeletal - no joint tenderness, deformity or swelling  Extremities: peripheral pulses normal, no clubbing or cyanosis.    Lab on 10/03/2023   Component Date Value Ref Range Status    WBC 10/03/2023 3.6 (L)  4.4 - 11.3 x10*3/uL Final    " nRBC 10/03/2023 0.0  0.0 - 0.0 /100 WBCs Final    RBC 10/03/2023 4.87  4.50 - 5.90 x10*6/uL Final    Hemoglobin 10/03/2023 14.7  13.5 - 17.5 g/dL Final    Hematocrit 10/03/2023 45.3  41.0 - 52.0 % Final    MCV 10/03/2023 93  80 - 100 fL Final    MCH 10/03/2023 30.2  26.0 - 34.0 pg Final    MCHC 10/03/2023 32.5  32.0 - 36.0 g/dL Final    RDW 10/03/2023 13.1  11.5 - 14.5 % Final    Platelets 10/03/2023 214  150 - 450 x10*3/uL Final    MPV 10/03/2023 11.6 (H)  7.5 - 11.5 fL Final    Neutrophils % 10/03/2023 46.7  40.0 - 80.0 % Final    Immature Granulocytes %, Automated 10/03/2023 0.0  0.0 - 0.9 % Final    Immature Granulocyte Count (IG) includes promyelocytes, myelocytes and metamyelocytes but does not include bands. Percent differential counts (%) should be interpreted in the context of the absolute cell counts (cells/UL).    Lymphocytes % 10/03/2023 34.3  13.0 - 44.0 % Final    Monocytes % 10/03/2023 10.6  2.0 - 10.0 % Final    Eosinophils % 10/03/2023 6.7  0.0 - 6.0 % Final    Basophils % 10/03/2023 1.7  0.0 - 2.0 % Final    Neutrophils Absolute 10/03/2023 1.68  1.20 - 7.70 x10*3/uL Final    Percent differential counts (%) should be interpreted in the context of the absolute cell counts (cells/uL).    Immature Granulocytes Absolute, Au* 10/03/2023 0.00  0.00 - 0.70 x10*3/uL Final    Lymphocytes Absolute 10/03/2023 1.23  1.20 - 4.80 x10*3/uL Final    Monocytes Absolute 10/03/2023 0.38  0.10 - 1.00 x10*3/uL Final    Eosinophils Absolute 10/03/2023 0.24  0.00 - 0.70 x10*3/uL Final    Basophils Absolute 10/03/2023 0.06  0.00 - 0.10 x10*3/uL Final    Glucose 10/03/2023 99  74 - 99 mg/dL Final    Sodium 10/03/2023 140  136 - 145 mmol/L Final    Potassium 10/03/2023 4.4  3.5 - 5.3 mmol/L Final    Chloride 10/03/2023 104  98 - 107 mmol/L Final    Bicarbonate 10/03/2023 28  21 - 32 mmol/L Final    Anion Gap 10/03/2023 12  10 - 20 mmol/L Final    Urea Nitrogen 10/03/2023 22  6 - 23 mg/dL Final    Creatinine 10/03/2023 1.16   0.50 - 1.30 mg/dL Final    eGFR 10/03/2023 71  >60 mL/min/1.73m*2 Final    Calculations of estimated GFR are performed using the 2021 CKD-EPI Study Refit  equation without the race variable for the IDMS-Traceable Creatinine Methods.    https://jasn.asnjournals.org/content/early/2021/09/22/ASN.4187214059    Calcium 10/03/2023 9.6  8.6 - 10.3 mg/dL Final    Albumin 10/03/2023 4.5  3.4 - 5.0 g/dL Final    Alkaline Phosphatase 10/03/2023 54  33 - 136 U/L Final    Total Protein 10/03/2023 7.4  6.4 - 8.2 g/dL Final    AST 10/03/2023 16  9 - 39 U/L Final    Bilirubin, Total 10/03/2023 0.9  0.0 - 1.2 mg/dL Final    ALT 10/03/2023 15  10 - 52 U/L Final    Patients treated with Sulfasalazine may generate falsely decreased results for ALT.    Cholesterol 10/03/2023 200 (H)  0 - 199 mg/dL Final          Age      Desirable   Borderline High   High     0-19 Y     0 - 169       170 - 199     >/= 200    20-24 Y     0 - 189       190 - 224     >/= 225         >24 Y     0 - 199       200 - 239     >/= 240   **All ranges are based on fasting samples. Specific   therapeutic targets will vary based on patient-specific   cardiac risk.    Pediatric guidelines reference:Pediatrics 2011, 128(S5).Adult guidelines reference: NCEP ATPIII Guidelines,COURTNEY 2001, 258:2486-97    Venipuncture immediately after or during the administration of Metamizole may lead to falsely low results. Testing should be performed immediately prior to Metamizole dosing.    HDL-Cholesterol 10/03/2023 49.0  mg/dL Final      Age       Very Low   Low     Normal    High    0-19 Y    < 35      < 40     40-45     ----  20-24 Y    ----     < 40      >45      ----        >24 Y      ----     < 40     40-60      >60      Cholesterol/HDL Ratio 10/03/2023 4.1   Final      Ref Values  Desirable  < 3.4  High Risk  > 5.0    LDL Calculated 10/03/2023 126 (L)  140 - 190 mg/dL Final                                Near   Borderline      AGE      Desirable  Optimal    High     High      "Very High     0-19 Y     0 - 109     ---    110-129   >/= 130     ----    20-24 Y     0 - 119     ---    120-159   >/= 160     ----      >24 Y     0 -  99   100-129  130-159   160-189     >/=190      VLDL 10/03/2023 25  0 - 40 mg/dL Final    Triglycerides 10/03/2023 124  0 - 149 mg/dL Final       Age         Desirable   Borderline High   High     Very High   0 D-90 D    19 - 174         ----         ----        ----  91 D- 9 Y     0 -  74        75 -  99     >/= 100      ----    10-19 Y     0 -  89        90 - 129     >/= 130      ----    20-24 Y     0 - 114       115 - 149     >/= 150      ----         >24 Y     0 - 149       150 - 199    200- 499    >/= 500    Venipuncture immediately after or during the administration of Metamizole may lead to falsely low results. Testing should be performed immediately prior to Metamizole dosing.    Non HDL Cholesterol 10/03/2023 151 (H)  0 - 149 mg/dL Final          Age       Desirable   Borderline High   High     Very High     0-19 Y     0 - 119       120 - 144     >/= 145    >/= 160    20-24 Y     0 - 149       150 - 189     >/= 190      ----         >24 Y    30 mg/dL above LDL Cholesterol goal           Assessment / Plan:  Problem List Items Addressed This Visit       Essential hypertension - Primary    Current Assessment & Plan     Well-controlled, continue current medications and management.  Dietary sodium restriction.  Regular aerobic exercise program is recommended to help achieve and maintain normal body weight, fitness and improve lipid balance. .  Dietary changes: Increase soluble fiber  Plant sterols 2grams per day (e.g. Benecol)  Reduce saturated fat, \"trans\" monounsaturated fatty acids, and cholesterol         Relevant Medications    lisinopriL-hydrochlorothiazide 10-12.5 mg tablet    Other Relevant Orders    Follow Up In [facility] - PCP - Established        Outpatient Encounter Medications as of 10/10/2023   Medication Sig Dispense Refill    aspirin " 81 mg EC tablet Take 1 tablet (81 mg) by mouth once daily.      [DISCONTINUED] lisinopriL-hydrochlorothiazide 10-12.5 mg tablet 1 tablet.      lisinopriL-hydrochlorothiazide 10-12.5 mg tablet Take 1 tablet by mouth once daily. 90 tablet 1    [DISCONTINUED] aspirin 325 mg tablet Take 1 tablet (325 mg) by mouth once daily.      [DISCONTINUED] hydroCHLOROthiazide (HYDRODiuril) 12.5 mg tablet Take 1 tablet (12.5 mg) by mouth once daily.      [DISCONTINUED] lisinopril 10 mg tablet Take 1 tablet (10 mg) by mouth once daily.       No facility-administered encounter medications on file as of 10/10/2023.     Scribe Attestation  By signing my name below, I, Asim Montaño   attest that this documentation has been prepared under the direction and in the presence of Juan Larios MD.

## 2024-04-03 DIAGNOSIS — I10 ESSENTIAL HYPERTENSION: ICD-10-CM

## 2024-04-03 RX ORDER — LISINOPRIL AND HYDROCHLOROTHIAZIDE 10; 12.5 MG/1; MG/1
1 TABLET ORAL DAILY
Qty: 90 TABLET | Refills: 0 | Status: SHIPPED | OUTPATIENT
Start: 2024-04-03 | End: 2024-04-08 | Stop reason: SDUPTHER

## 2024-04-08 ENCOUNTER — OFFICE VISIT (OUTPATIENT)
Dept: PRIMARY CARE | Facility: CLINIC | Age: 64
End: 2024-04-08
Payer: COMMERCIAL

## 2024-04-08 VITALS
TEMPERATURE: 97.4 F | HEART RATE: 84 BPM | BODY MASS INDEX: 23.87 KG/M2 | DIASTOLIC BLOOD PRESSURE: 70 MMHG | OXYGEN SATURATION: 96 % | SYSTOLIC BLOOD PRESSURE: 130 MMHG | RESPIRATION RATE: 19 BRPM | HEIGHT: 76 IN | WEIGHT: 196 LBS

## 2024-04-08 DIAGNOSIS — I10 ESSENTIAL HYPERTENSION: ICD-10-CM

## 2024-04-08 DIAGNOSIS — Z00.00 HEALTHCARE MAINTENANCE: Primary | ICD-10-CM

## 2024-04-08 DIAGNOSIS — Z12.5 ENCOUNTER FOR SCREENING FOR MALIGNANT NEOPLASM OF PROSTATE: ICD-10-CM

## 2024-04-08 PROCEDURE — 99396 PREV VISIT EST AGE 40-64: CPT | Performed by: FAMILY MEDICINE

## 2024-04-08 PROCEDURE — 3074F SYST BP LT 130 MM HG: CPT | Performed by: FAMILY MEDICINE

## 2024-04-08 PROCEDURE — 1036F TOBACCO NON-USER: CPT | Performed by: FAMILY MEDICINE

## 2024-04-08 PROCEDURE — 3078F DIAST BP <80 MM HG: CPT | Performed by: FAMILY MEDICINE

## 2024-04-08 RX ORDER — LISINOPRIL AND HYDROCHLOROTHIAZIDE 10; 12.5 MG/1; MG/1
1 TABLET ORAL DAILY
Qty: 90 TABLET | Refills: 1 | Status: SHIPPED | OUTPATIENT
Start: 2024-04-08

## 2024-04-08 ASSESSMENT — ENCOUNTER SYMPTOMS
VOMITING: 0
DYSURIA: 0
FEVER: 0
COUGH: 0
RHINORRHEA: 0
WHEEZING: 0
SHORTNESS OF BREATH: 0
ABDOMINAL PAIN: 0
FREQUENCY: 0
HEADACHES: 0
NUMBNESS: 0
SORE THROAT: 0
HEMATURIA: 0
CONSTIPATION: 0
DIZZINESS: 0
PALPITATIONS: 0
TREMORS: 0
DIARRHEA: 0
CHILLS: 0

## 2024-04-08 ASSESSMENT — PATIENT HEALTH QUESTIONNAIRE - PHQ9
2. FEELING DOWN, DEPRESSED OR HOPELESS: NOT AT ALL
SUM OF ALL RESPONSES TO PHQ9 QUESTIONS 1 AND 2: 0
1. LITTLE INTEREST OR PLEASURE IN DOING THINGS: NOT AT ALL

## 2024-04-08 NOTE — PROGRESS NOTES
"Subjective   Patient ID: Adithya Elias is a 63 y.o. male who presents for Annual Exam and Follow-up (Hypertension).    Patient presents today for annual physical exam and follow-up on hypertension. Cardiac symptoms: none. He has no chest pain, dyspnea, exertional chest pressure/discomfort, near-syncope, orthopnea, palpitations, paroxysmal nocturnal dyspnea, and syncope. Use of agents associated with hypertension: none. History of target organ damage: none.       Review of Systems   Constitutional:  Negative for chills and fever.   HENT:  Negative for congestion, ear pain, nosebleeds, rhinorrhea and sore throat.    Respiratory:  Negative for cough, shortness of breath and wheezing.    Cardiovascular:  Negative for chest pain, palpitations and leg swelling.   Gastrointestinal:  Negative for abdominal pain, constipation, diarrhea and vomiting.   Genitourinary:  Negative for dysuria, frequency and hematuria.   Neurological:  Negative for dizziness, tremors, numbness and headaches.       Objective   /70   Pulse 84   Temp 36.3 °C (97.4 °F)   Resp 19   Ht 1.93 m (6' 4\")   Wt 88.9 kg (196 lb)   SpO2 96%   BMI 23.86 kg/m²     Physical Exam  Constitutional:       General: He is not in acute distress.     Appearance: Normal appearance.   HENT:      Head: Normocephalic and atraumatic.      Mouth/Throat:      Mouth: Mucous membranes are moist.      Pharynx: Oropharynx is clear. No oropharyngeal exudate or posterior oropharyngeal erythema.   Eyes:      General: No scleral icterus.     Extraocular Movements: Extraocular movements intact.      Pupils: Pupils are equal, round, and reactive to light.   Cardiovascular:      Rate and Rhythm: Normal rate and regular rhythm.      Pulses: Normal pulses.      Heart sounds: No murmur heard.     No friction rub. No gallop.   Pulmonary:      Effort: Pulmonary effort is normal.      Breath sounds: No wheezing, rhonchi or rales.   Skin:     General: Skin is warm.      Coloration: " Skin is not jaundiced or pale.      Findings: No erythema or rash.   Neurological:      General: No focal deficit present.      Mental Status: He is alert and oriented to person, place, and time.      Cranial Nerves: No cranial nerve deficit.      Sensory: No sensory deficit.      Coordination: Coordination normal.      Gait: Gait normal.       Lab on 10/03/2023   Component Date Value Ref Range Status    WBC 10/03/2023 3.6 (L)  4.4 - 11.3 x10*3/uL Final    nRBC 10/03/2023 0.0  0.0 - 0.0 /100 WBCs Final    RBC 10/03/2023 4.87  4.50 - 5.90 x10*6/uL Final    Hemoglobin 10/03/2023 14.7  13.5 - 17.5 g/dL Final    Hematocrit 10/03/2023 45.3  41.0 - 52.0 % Final    MCV 10/03/2023 93  80 - 100 fL Final    MCH 10/03/2023 30.2  26.0 - 34.0 pg Final    MCHC 10/03/2023 32.5  32.0 - 36.0 g/dL Final    RDW 10/03/2023 13.1  11.5 - 14.5 % Final    Platelets 10/03/2023 214  150 - 450 x10*3/uL Final    MPV 10/03/2023 11.6 (H)  7.5 - 11.5 fL Final    Neutrophils % 10/03/2023 46.7  40.0 - 80.0 % Final    Immature Granulocytes %, Automated 10/03/2023 0.0  0.0 - 0.9 % Final    Immature Granulocyte Count (IG) includes promyelocytes, myelocytes and metamyelocytes but does not include bands. Percent differential counts (%) should be interpreted in the context of the absolute cell counts (cells/UL).    Lymphocytes % 10/03/2023 34.3  13.0 - 44.0 % Final    Monocytes % 10/03/2023 10.6  2.0 - 10.0 % Final    Eosinophils % 10/03/2023 6.7  0.0 - 6.0 % Final    Basophils % 10/03/2023 1.7  0.0 - 2.0 % Final    Neutrophils Absolute 10/03/2023 1.68  1.20 - 7.70 x10*3/uL Final    Percent differential counts (%) should be interpreted in the context of the absolute cell counts (cells/uL).    Immature Granulocytes Absolute, Au* 10/03/2023 0.00  0.00 - 0.70 x10*3/uL Final    Lymphocytes Absolute 10/03/2023 1.23  1.20 - 4.80 x10*3/uL Final    Monocytes Absolute 10/03/2023 0.38  0.10 - 1.00 x10*3/uL Final    Eosinophils Absolute 10/03/2023 0.24  0.00 - 0.70  x10*3/uL Final    Basophils Absolute 10/03/2023 0.06  0.00 - 0.10 x10*3/uL Final    Glucose 10/03/2023 99  74 - 99 mg/dL Final    Sodium 10/03/2023 140  136 - 145 mmol/L Final    Potassium 10/03/2023 4.4  3.5 - 5.3 mmol/L Final    Chloride 10/03/2023 104  98 - 107 mmol/L Final    Bicarbonate 10/03/2023 28  21 - 32 mmol/L Final    Anion Gap 10/03/2023 12  10 - 20 mmol/L Final    Urea Nitrogen 10/03/2023 22  6 - 23 mg/dL Final    Creatinine 10/03/2023 1.16  0.50 - 1.30 mg/dL Final    eGFR 10/03/2023 71  >60 mL/min/1.73m*2 Final    Calculations of estimated GFR are performed using the 2021 CKD-EPI Study Refit  equation without the race variable for the IDMS-Traceable Creatinine Methods.    https://jasn.asnjournals.org/content/early/2021/09/22/ASN.5924008911    Calcium 10/03/2023 9.6  8.6 - 10.3 mg/dL Final    Albumin 10/03/2023 4.5  3.4 - 5.0 g/dL Final    Alkaline Phosphatase 10/03/2023 54  33 - 136 U/L Final    Total Protein 10/03/2023 7.4  6.4 - 8.2 g/dL Final    AST 10/03/2023 16  9 - 39 U/L Final    Bilirubin, Total 10/03/2023 0.9  0.0 - 1.2 mg/dL Final    ALT 10/03/2023 15  10 - 52 U/L Final    Patients treated with Sulfasalazine may generate falsely decreased results for ALT.    Cholesterol 10/03/2023 200 (H)  0 - 199 mg/dL Final          Age      Desirable   Borderline High   High     0-19 Y     0 - 169       170 - 199     >/= 200    20-24 Y     0 - 189       190 - 224     >/= 225         >24 Y     0 - 199       200 - 239     >/= 240   **All ranges are based on fasting samples. Specific   therapeutic targets will vary based on patient-specific   cardiac risk.    Pediatric guidelines reference:Pediatrics 2011, 128(S5).Adult guidelines reference: NCEP ATPIII Guidelines,COURTNEY 2001, 258:2486-97    Venipuncture immediately after or during the administration of Metamizole may lead to falsely low results. Testing should be performed immediately prior to Metamizole dosing.    HDL-Cholesterol 10/03/2023 49.0  mg/dL Final       Age       Very Low   Low     Normal    High    0-19 Y    < 35      < 40     40-45     ----  20-24 Y    ----     < 40      >45      ----        >24 Y      ----     < 40     40-60      >60      Cholesterol/HDL Ratio 10/03/2023 4.1   Final      Ref Values  Desirable  < 3.4  High Risk  > 5.0    LDL Calculated 10/03/2023 126 (L)  140 - 190 mg/dL Final                                Near   Borderline      AGE      Desirable  Optimal    High     High     Very High     0-19 Y     0 - 109     ---    110-129   >/= 130     ----    20-24 Y     0 - 119     ---    120-159   >/= 160     ----      >24 Y     0 -  99   100-129  130-159   160-189     >/=190      VLDL 10/03/2023 25  0 - 40 mg/dL Final    Triglycerides 10/03/2023 124  0 - 149 mg/dL Final       Age         Desirable   Borderline High   High     Very High   0 D-90 D    19 - 174         ----         ----        ----  91 D- 9 Y     0 -  74        75 -  99     >/= 100      ----    10-19 Y     0 -  89        90 - 129     >/= 130      ----    20-24 Y     0 - 114       115 - 149     >/= 150      ----         >24 Y     0 - 149       150 - 199    200- 499    >/= 500    Venipuncture immediately after or during the administration of Metamizole may lead to falsely low results. Testing should be performed immediately prior to Metamizole dosing.    Non HDL Cholesterol 10/03/2023 151 (H)  0 - 149 mg/dL Final          Age       Desirable   Borderline High   High     Very High     0-19 Y     0 - 119       120 - 144     >/= 145    >/= 160    20-24 Y     0 - 149       150 - 189     >/= 190      ----         >24 Y    30 mg/dL above LDL Cholesterol goal          Assessment/Plan   Problem List Items Addressed This Visit       Essential hypertension     Well-controlled, continue current medications and management.  Dietary sodium restriction.  Regular aerobic exercise program is recommended to help achieve and maintain normal body weight, fitness and improve lipid balance. .  Dietary  "changes: Increase soluble fiber  Plant sterols 2grams per day (e.g. Benecol)  Reduce saturated fat, \"trans\" monounsaturated fatty acids, and cholesterol         Relevant Medications    lisinopriL-hydrochlorothiazide 10-12.5 mg tablet    Other Relevant Orders    Follow Up In [facility] - PCP - Established    Healthcare maintenance - Primary     Recommend low-cholesterol diet, low-fat diet and low-salt diet.  The need for lifelong dietary compliance in order to reduce cardiac risk is recommended.  We will also recommend regular exercise program to improve lipid balance and overall health.  Recommend decreasing fat and cholesterol in diet, increasing aerobic exercise with a goal of 4 or more days per week         Relevant Orders    CBC and Auto Differential    Comprehensive Metabolic Panel    Lipid Panel     Other Visit Diagnoses       Encounter for screening for malignant neoplasm of prostate        Relevant Orders    Prostate Specific Antigen, Screen          Scribe Attestation  By signing my name below, IIgor Scribe   attest that this documentation has been prepared under the direction and in the presence of Juan Larios MD.  "

## 2024-10-01 ENCOUNTER — APPOINTMENT (OUTPATIENT)
Dept: PRIMARY CARE | Facility: CLINIC | Age: 64
End: 2024-10-01
Payer: COMMERCIAL

## 2024-10-10 ENCOUNTER — APPOINTMENT (OUTPATIENT)
Dept: PRIMARY CARE | Facility: CLINIC | Age: 64
End: 2024-10-10
Payer: COMMERCIAL

## 2024-10-10 VITALS
WEIGHT: 196.8 LBS | TEMPERATURE: 97.5 F | OXYGEN SATURATION: 97 % | RESPIRATION RATE: 18 BRPM | DIASTOLIC BLOOD PRESSURE: 80 MMHG | HEIGHT: 76 IN | BODY MASS INDEX: 23.97 KG/M2 | HEART RATE: 106 BPM | SYSTOLIC BLOOD PRESSURE: 128 MMHG

## 2024-10-10 DIAGNOSIS — Z12.5 ENCOUNTER FOR SCREENING FOR MALIGNANT NEOPLASM OF PROSTATE: ICD-10-CM

## 2024-10-10 DIAGNOSIS — Z00.00 HEALTHCARE MAINTENANCE: ICD-10-CM

## 2024-10-10 DIAGNOSIS — I10 ESSENTIAL HYPERTENSION: Primary | ICD-10-CM

## 2024-10-10 PROCEDURE — 3074F SYST BP LT 130 MM HG: CPT | Performed by: FAMILY MEDICINE

## 2024-10-10 PROCEDURE — 1036F TOBACCO NON-USER: CPT | Performed by: FAMILY MEDICINE

## 2024-10-10 PROCEDURE — 99213 OFFICE O/P EST LOW 20 MIN: CPT | Performed by: FAMILY MEDICINE

## 2024-10-10 PROCEDURE — 3008F BODY MASS INDEX DOCD: CPT | Performed by: FAMILY MEDICINE

## 2024-10-10 PROCEDURE — 3078F DIAST BP <80 MM HG: CPT | Performed by: FAMILY MEDICINE

## 2024-10-10 RX ORDER — LISINOPRIL AND HYDROCHLOROTHIAZIDE 10; 12.5 MG/1; MG/1
1 TABLET ORAL DAILY
Qty: 90 TABLET | Refills: 1 | Status: SHIPPED | OUTPATIENT
Start: 2024-10-10

## 2024-10-10 ASSESSMENT — PATIENT HEALTH QUESTIONNAIRE - PHQ9
1. LITTLE INTEREST OR PLEASURE IN DOING THINGS: NOT AT ALL
SUM OF ALL RESPONSES TO PHQ9 QUESTIONS 1 AND 2: 0
2. FEELING DOWN, DEPRESSED OR HOPELESS: NOT AT ALL

## 2024-10-10 ASSESSMENT — ENCOUNTER SYMPTOMS
FREQUENCY: 0
RHINORRHEA: 0
ABDOMINAL PAIN: 0
CHILLS: 0
CONSTIPATION: 0
NUMBNESS: 0
VOMITING: 0
FEVER: 0
DIZZINESS: 0
HEMATURIA: 0
SHORTNESS OF BREATH: 0
PALPITATIONS: 0
TREMORS: 0
SORE THROAT: 0
HEADACHES: 0
DIARRHEA: 0
COUGH: 0
WHEEZING: 0
DYSURIA: 0

## 2024-10-10 NOTE — PROGRESS NOTES
"Subjective   Patient ID: Adithya Elias is a 63 y.o. male who presents for Follow-up (Hypertension).    Patient is here for follow-up on hypertension. Cardiac symptoms: none. He has no chest pain, dyspnea, exertional chest pressure/discomfort, near-syncope, orthopnea, palpitations, paroxysmal nocturnal dyspnea, and syncope. Use of agents associated with hypertension: none. History of target organ damage: none.       Review of Systems   Constitutional:  Negative for chills and fever.   HENT:  Negative for congestion, ear pain, nosebleeds, rhinorrhea and sore throat.    Respiratory:  Negative for cough, shortness of breath and wheezing.    Cardiovascular:  Negative for chest pain, palpitations and leg swelling.   Gastrointestinal:  Negative for abdominal pain, constipation, diarrhea and vomiting.   Genitourinary:  Negative for dysuria, frequency and hematuria.   Neurological:  Negative for dizziness, tremors, numbness and headaches.       Objective   /80   Pulse 106   Temp 36.4 °C (97.5 °F)   Resp 18   Ht 1.93 m (6' 4\")   Wt 89.3 kg (196 lb 12.8 oz)   SpO2 97%   BMI 23.96 kg/m²     Physical Exam  Constitutional:       General: He is not in acute distress.     Appearance: Normal appearance.   HENT:      Head: Normocephalic and atraumatic.      Mouth/Throat:      Mouth: Mucous membranes are moist.      Pharynx: Oropharynx is clear. No oropharyngeal exudate or posterior oropharyngeal erythema.   Eyes:      General: No scleral icterus.     Extraocular Movements: Extraocular movements intact.      Pupils: Pupils are equal, round, and reactive to light.   Cardiovascular:      Rate and Rhythm: Normal rate and regular rhythm.      Pulses: Normal pulses.      Heart sounds: No murmur heard.     No friction rub. No gallop.   Pulmonary:      Effort: Pulmonary effort is normal.      Breath sounds: No wheezing, rhonchi or rales.   Skin:     General: Skin is warm.      Coloration: Skin is not jaundiced or pale.      " "Findings: No erythema or rash.   Neurological:      General: No focal deficit present.      Mental Status: He is alert and oriented to person, place, and time.      Cranial Nerves: No cranial nerve deficit.      Sensory: No sensory deficit.      Coordination: Coordination normal.      Gait: Gait normal.         Assessment/Plan   Problem List Items Addressed This Visit       Essential hypertension - Primary     Well-controlled, continue current medications and management.  Dietary sodium restriction.  Regular aerobic exercise program is recommended to help achieve and maintain normal body weight, fitness and improve lipid balance. .  Dietary changes: Increase soluble fiber  Plant sterols 2grams per day (e.g. Benecol)  Reduce saturated fat, \"trans\" monounsaturated fatty acids, and cholesterol         Relevant Medications    lisinopriL-hydrochlorothiazide 10-12.5 mg tablet    Other Relevant Orders    Follow Up In [facility] - PCP - Established    Healthcare maintenance     Recommend low-cholesterol diet, low-fat diet and low-salt diet.  The need for lifelong dietary compliance in order to reduce cardiac risk is recommended.  We will also recommend regular exercise program to improve lipid balance and overall health.  Recommend decreasing fat and cholesterol in diet, increasing aerobic exercise with a goal of 4 or more days per week         Relevant Orders    Prostate Specific Antigen, Screen     Other Visit Diagnoses       Encounter for screening for malignant neoplasm of prostate        Relevant Orders    CBC and Auto Differential    Comprehensive Metabolic Panel    Lipid Panel          Recommend colonoscopy or Cologuard but he declined.  The benefits of screening was emphasized.  Also discussed preventive immunization but he declined.    Scribe Attestation  By signing my name below, IIgor Scribe   attest that this documentation has been prepared under the direction and in the presence of " Juan Larios MD.

## 2025-04-09 DIAGNOSIS — Z12.5 SCREENING FOR MALIGNANT NEOPLASM OF PROSTATE: ICD-10-CM

## 2025-04-09 DIAGNOSIS — R73.9 HYPERGLYCEMIA: ICD-10-CM

## 2025-04-09 DIAGNOSIS — I10 ESSENTIAL HYPERTENSION: ICD-10-CM

## 2025-04-09 LAB
ALBUMIN SERPL-MCNC: 4.5 G/DL (ref 3.6–5.1)
ALP SERPL-CCNC: 55 U/L (ref 35–144)
ALT SERPL-CCNC: 27 U/L (ref 9–46)
ANION GAP SERPL CALCULATED.4IONS-SCNC: 8 MMOL/L (CALC) (ref 7–17)
AST SERPL-CCNC: 22 U/L (ref 10–35)
BASOPHILS # BLD AUTO: 40 CELLS/UL (ref 0–200)
BASOPHILS NFR BLD AUTO: 1.3 %
BILIRUB SERPL-MCNC: 0.6 MG/DL (ref 0.2–1.2)
BUN SERPL-MCNC: 21 MG/DL (ref 7–25)
CALCIUM SERPL-MCNC: 9.5 MG/DL (ref 8.6–10.3)
CHLORIDE SERPL-SCNC: 105 MMOL/L (ref 98–110)
CHOLEST SERPL-MCNC: 158 MG/DL
CHOLEST/HDLC SERPL: 4 (CALC)
CO2 SERPL-SCNC: 28 MMOL/L (ref 20–32)
CREAT SERPL-MCNC: 0.99 MG/DL (ref 0.7–1.35)
EGFRCR SERPLBLD CKD-EPI 2021: 85 ML/MIN/1.73M2
EOSINOPHIL # BLD AUTO: 242 CELLS/UL (ref 15–500)
EOSINOPHIL NFR BLD AUTO: 7.8 %
ERYTHROCYTE [DISTWIDTH] IN BLOOD BY AUTOMATED COUNT: 13.1 % (ref 11–15)
GLUCOSE SERPL-MCNC: 114 MG/DL (ref 65–99)
HCT VFR BLD AUTO: 45.2 % (ref 38.5–50)
HDLC SERPL-MCNC: 40 MG/DL
HGB BLD-MCNC: 14.9 G/DL (ref 13.2–17.1)
LDLC SERPL CALC-MCNC: 100 MG/DL (CALC)
LYMPHOCYTES # BLD AUTO: 1032 CELLS/UL (ref 850–3900)
LYMPHOCYTES NFR BLD AUTO: 33.3 %
MCH RBC QN AUTO: 29.9 PG (ref 27–33)
MCHC RBC AUTO-ENTMCNC: 33 G/DL (ref 32–36)
MCV RBC AUTO: 90.6 FL (ref 80–100)
MONOCYTES # BLD AUTO: 285 CELLS/UL (ref 200–950)
MONOCYTES NFR BLD AUTO: 9.2 %
NEUTROPHILS # BLD AUTO: 1500 CELLS/UL (ref 1500–7800)
NEUTROPHILS NFR BLD AUTO: 48.4 %
NONHDLC SERPL-MCNC: 118 MG/DL (CALC)
PLATELET # BLD AUTO: 247 THOUSAND/UL (ref 140–400)
PMV BLD REES-ECKER: 11.3 FL (ref 7.5–12.5)
POTASSIUM SERPL-SCNC: 4.4 MMOL/L (ref 3.5–5.3)
PROT SERPL-MCNC: 7.1 G/DL (ref 6.1–8.1)
PSA SERPL-MCNC: 0.63 NG/ML
RBC # BLD AUTO: 4.99 MILLION/UL (ref 4.2–5.8)
SODIUM SERPL-SCNC: 141 MMOL/L (ref 135–146)
TRIGL SERPL-MCNC: 86 MG/DL
WBC # BLD AUTO: 3.1 THOUSAND/UL (ref 3.8–10.8)

## 2025-04-10 ENCOUNTER — APPOINTMENT (OUTPATIENT)
Dept: PRIMARY CARE | Facility: CLINIC | Age: 65
End: 2025-04-10
Payer: COMMERCIAL

## 2025-04-10 VITALS
SYSTOLIC BLOOD PRESSURE: 120 MMHG | RESPIRATION RATE: 16 BRPM | WEIGHT: 198.4 LBS | HEIGHT: 76 IN | BODY MASS INDEX: 24.16 KG/M2 | HEART RATE: 77 BPM | DIASTOLIC BLOOD PRESSURE: 74 MMHG | OXYGEN SATURATION: 99 % | TEMPERATURE: 97.7 F

## 2025-04-10 DIAGNOSIS — I10 ESSENTIAL HYPERTENSION: ICD-10-CM

## 2025-04-10 DIAGNOSIS — Z00.00 WELCOME TO MEDICARE PREVENTIVE VISIT: Primary | ICD-10-CM

## 2025-04-10 RX ORDER — LISINOPRIL AND HYDROCHLOROTHIAZIDE 10; 12.5 MG/1; MG/1
1 TABLET ORAL DAILY
Qty: 90 TABLET | Refills: 1 | Status: SHIPPED | OUTPATIENT
Start: 2025-04-10

## 2025-04-10 ASSESSMENT — ACTIVITIES OF DAILY LIVING (ADL)
DRESSING: INDEPENDENT
GROCERY_SHOPPING: INDEPENDENT
MANAGING_FINANCES: INDEPENDENT
BATHING: INDEPENDENT
DOING_HOUSEWORK: INDEPENDENT
TAKING_MEDICATION: INDEPENDENT

## 2025-04-10 NOTE — PROGRESS NOTES
"Subjective   Patient ID: Adithya Elias is a 64 y.o. male who presents for Welcome To Medicare and Follow-up (Hypertension and labs).    He presents for Welcome to Medicare Visit and follow-up on hypertension. Cardiac symptoms: none. He has no chest pain, dyspnea, exertional chest pressure/discomfort, near-syncope, orthopnea, palpitations, paroxysmal nocturnal dyspnea, and syncope. Use of agents associated with hypertension: none. History of target organ damage: none.      Past Medical, Surgical, and Family History reviewed and updated in chart.    Reviewed all medications by prescribing practitioner or clinical pharmacist (such as prescriptions, OTCs, herbal therapies and supplements) and documented in the medical record.    Patient Self Assessment of Health Status  Patient Self Assessment: Excellent    Nutrition and Exercise  Current Diet: Unhealthy Diet, Well Balanced Diet  Adequate Fluid Intake: Yes  Caffeine: Yes  Exercise Frequency: Regularly    Functional Ability/Level of Safety  Cognitive Impairment Observed: No cognitive impairment observed  Cognitive Impairment Reported: No cognitive impairment reported by patient or family    Home Safety Risk Factors: None    Review of Systems   Constitutional:  Negative for chills and fever.   HENT:  Negative for congestion, ear pain, nosebleeds, rhinorrhea and sore throat.    Respiratory:  Negative for cough, shortness of breath and wheezing.    Cardiovascular:  Negative for chest pain, palpitations and leg swelling.   Gastrointestinal:  Negative for abdominal pain, constipation, diarrhea and vomiting.   Endocrine: Negative for cold intolerance, heat intolerance, polydipsia, polyphagia and polyuria.   Genitourinary:  Negative for dysuria, frequency and hematuria.   Neurological:  Negative for dizziness, tremors, numbness and headaches.       Objective   /74   Pulse 77   Temp 36.5 °C (97.7 °F)   Resp 16   Ht 1.93 m (6' 4\")   Wt 90 kg (198 lb 6.4 oz)   SpO2 " 99%   BMI 24.15 kg/m²     Physical Exam  Constitutional:       General: He is not in acute distress.     Appearance: Normal appearance.   HENT:      Head: Normocephalic and atraumatic.      Mouth/Throat:      Mouth: Mucous membranes are moist.      Pharynx: Oropharynx is clear. No oropharyngeal exudate or posterior oropharyngeal erythema.   Eyes:      General: No scleral icterus.     Extraocular Movements: Extraocular movements intact.      Pupils: Pupils are equal, round, and reactive to light.   Cardiovascular:      Rate and Rhythm: Normal rate and regular rhythm.      Pulses: Normal pulses.      Heart sounds: No murmur heard.     No friction rub. No gallop.   Pulmonary:      Effort: Pulmonary effort is normal.      Breath sounds: No wheezing, rhonchi or rales.   Skin:     General: Skin is warm.      Coloration: Skin is not jaundiced or pale.      Findings: No erythema or rash.   Neurological:      General: No focal deficit present.      Mental Status: He is alert and oriented to person, place, and time.      Cranial Nerves: No cranial nerve deficit.      Sensory: No sensory deficit.      Coordination: Coordination normal.      Gait: Gait normal.         Orders Only on 04/09/2025   Component Date Value Ref Range Status    WHITE BLOOD CELL COUNT 04/09/2025 3.1 (L)  3.8 - 10.8 Thousand/uL Final    RED BLOOD CELL COUNT 04/09/2025 4.99  4.20 - 5.80 Million/uL Final    HEMOGLOBIN 04/09/2025 14.9  13.2 - 17.1 g/dL Final    HEMATOCRIT 04/09/2025 45.2  38.5 - 50.0 % Final    MCV 04/09/2025 90.6  80.0 - 100.0 fL Final    MCH 04/09/2025 29.9  27.0 - 33.0 pg Final    MCHC 04/09/2025 33.0  32.0 - 36.0 g/dL Final    Comment: For adults, a slight decrease in the calculated MCHC  value (in the range of 30 to 32 g/dL) is most likely  not clinically significant; however, it should be  interpreted with caution in correlation with other  red cell parameters and the patient's clinical  condition.      RDW 04/09/2025 13.1  11.0 -  15.0 % Final    PLATELET COUNT 04/09/2025 247  140 - 400 Thousand/uL Final    MPV 04/09/2025 11.3  7.5 - 12.5 fL Final    ABSOLUTE NEUTROPHILS 04/09/2025 1,500  1,500 - 7,800 cells/uL Final    ABSOLUTE LYMPHOCYTES 04/09/2025 1,032  850 - 3,900 cells/uL Final    ABSOLUTE MONOCYTES 04/09/2025 285  200 - 950 cells/uL Final    ABSOLUTE EOSINOPHILS 04/09/2025 242  15 - 500 cells/uL Final    ABSOLUTE BASOPHILS 04/09/2025 40  0 - 200 cells/uL Final    NEUTROPHILS 04/09/2025 48.4  % Final    LYMPHOCYTES 04/09/2025 33.3  % Final    MONOCYTES 04/09/2025 9.2  % Final    EOSINOPHILS 04/09/2025 7.8  % Final    BASOPHILS 04/09/2025 1.3  % Final    GLUCOSE 04/09/2025 114 (H)  65 - 99 mg/dL Final    Comment:               Fasting reference interval     For someone without known diabetes, a glucose value  between 100 and 125 mg/dL is consistent with  prediabetes and should be confirmed with a  follow-up test.         UREA NITROGEN (BUN) 04/09/2025 21  7 - 25 mg/dL Final    CREATININE 04/09/2025 0.99  0.70 - 1.35 mg/dL Final    EGFR 04/09/2025 85  > OR = 60 mL/min/1.73m2 Final    SODIUM 04/09/2025 141  135 - 146 mmol/L Final    POTASSIUM 04/09/2025 4.4  3.5 - 5.3 mmol/L Final    CHLORIDE 04/09/2025 105  98 - 110 mmol/L Final    CARBON DIOXIDE 04/09/2025 28  20 - 32 mmol/L Final    ELECTROLYTE BALANCE 04/09/2025 8  7 - 17 mmol/L (calc) Final    CALCIUM 04/09/2025 9.5  8.6 - 10.3 mg/dL Final    PROTEIN, TOTAL 04/09/2025 7.1  6.1 - 8.1 g/dL Final    ALBUMIN 04/09/2025 4.5  3.6 - 5.1 g/dL Final    BILIRUBIN, TOTAL 04/09/2025 0.6  0.2 - 1.2 mg/dL Final    ALKALINE PHOSPHATASE 04/09/2025 55  35 - 144 U/L Final    AST 04/09/2025 22  10 - 35 U/L Final    ALT 04/09/2025 27  9 - 46 U/L Final    CHOLESTEROL, TOTAL 04/09/2025 158  <200 mg/dL Final    HDL CHOLESTEROL 04/09/2025 40  > OR = 40 mg/dL Final    TRIGLYCERIDES 04/09/2025 86  <150 mg/dL Final    LDL-CHOLESTEROL 04/09/2025 100 (H)  mg/dL (calc) Final    Comment: Reference range: <100    "  Desirable range <100 mg/dL for primary prevention;    <70 mg/dL for patients with CHD or diabetic patients   with > or = 2 CHD risk factors.     LDL-C is now calculated using the Jaja   calculation, which is a validated novel method providing   better accuracy than the Friedewald equation in the   estimation of LDL-C.   Grady SIMMS et al. COURTNEY. 2013;310(19): 0843-0943   (http://education.[employer]/faq/KVK728)      CHOL/HDLC RATIO 04/09/2025 4.0  <5.0 (calc) Final    NON HDL CHOLESTEROL 04/09/2025 118  <130 mg/dL (calc) Final    Comment: For patients with diabetes plus 1 major ASCVD risk   factor, treating to a non-HDL-C goal of <100 mg/dL   (LDL-C of <70 mg/dL) is considered a therapeutic   option.      PSA, TOTAL 04/09/2025 0.63  < OR = 4.00 ng/mL Final    Comment: The total PSA value from this assay system is   standardized against the WHO standard. The test   result will be approximately 20% lower when compared   to the equimolar-standardized total PSA ([employer]). Comparison of serial PSA results should be   interpreted with this fact in mind.     This test was performed using the Siemens   chemiluminescent method. Values obtained from   different assay methods cannot be used  interchangeably. PSA levels, regardless of  value, should not be interpreted as absolute  evidence of the presence or absence of disease.         Assessment/Plan   Problem List Items Addressed This Visit       Essential hypertension     Well-controlled, continue current medications and management.  Dietary sodium restriction.  Regular aerobic exercise program is recommended to help achieve and maintain normal body weight, fitness and improve lipid balance. .  Dietary changes: Increase soluble fiber  Plant sterols 2grams per day (e.g. Benecol)  Reduce saturated fat, \"trans\" monounsaturated fatty acids, and cholesterol         Relevant Medications    lisinopriL-hydrochlorothiazide 10-12.5 mg tablet    Other " Relevant Orders    Follow Up In [facility] - PCP    Welcome to Medicare preventive visit - Primary     Recommend low-cholesterol diet, low-fat diet and low-salt diet.  The need for lifelong dietary compliance in order to reduce cardiac risk is recommended.  We will also recommend regular exercise program to improve lipid balance and overall health.  Recommend decreasing fat and cholesterol in diet, increasing aerobic exercise with a goal of 4 or more days per week            Scribe Attestation  By signing my name below, IIgor Scribe   attest that this documentation has been prepared under the direction and in the presence of Juan Larios MD.

## 2025-04-12 ASSESSMENT — ENCOUNTER SYMPTOMS
COUGH: 0
VOMITING: 0
PALPITATIONS: 0
CHILLS: 0
ABDOMINAL PAIN: 0
SORE THROAT: 0
DIARRHEA: 0
POLYDIPSIA: 0
FEVER: 0
SHORTNESS OF BREATH: 0
DYSURIA: 0
HEMATURIA: 0
HEADACHES: 0
NUMBNESS: 0
RHINORRHEA: 0
POLYPHAGIA: 0
WHEEZING: 0
FREQUENCY: 0
DIZZINESS: 0
CONSTIPATION: 0
TREMORS: 0

## 2025-10-16 ENCOUNTER — APPOINTMENT (OUTPATIENT)
Dept: PRIMARY CARE | Facility: CLINIC | Age: 65
End: 2025-10-16
Payer: COMMERCIAL

## 2025-10-21 ENCOUNTER — APPOINTMENT (OUTPATIENT)
Dept: PRIMARY CARE | Facility: CLINIC | Age: 65
End: 2025-10-21
Payer: COMMERCIAL